# Patient Record
Sex: MALE | URBAN - METROPOLITAN AREA
[De-identification: names, ages, dates, MRNs, and addresses within clinical notes are randomized per-mention and may not be internally consistent; named-entity substitution may affect disease eponyms.]

---

## 2022-07-09 ENCOUNTER — TELEPHONE ENCOUNTER (OUTPATIENT)
Dept: URBAN - METROPOLITAN AREA CLINIC 121 | Facility: CLINIC | Age: 62
End: 2022-07-09

## 2022-07-10 ENCOUNTER — TELEPHONE ENCOUNTER (OUTPATIENT)
Dept: URBAN - METROPOLITAN AREA CLINIC 121 | Facility: CLINIC | Age: 62
End: 2022-07-10

## 2022-07-10 RX ORDER — EMTRICITABINE AND TENOFOVIR DISOPROXIL FUMARATE 200; 300 MG/1; MG/1
TABLET, FILM COATED ORAL
Refills: 0 | Status: ACTIVE | COMMUNITY
Start: 2012-05-03

## 2022-07-10 RX ORDER — DIAZEPAM 10 MG/1
TABLET ORAL
Refills: 0 | Status: ACTIVE | COMMUNITY
Start: 2012-05-03

## 2022-07-10 RX ORDER — LITHIUM CARBONATE 300 MG/1
CAPSULE, GELATIN COATED ORAL
Refills: 0 | Status: ACTIVE | COMMUNITY
Start: 2012-05-03

## 2022-07-30 ENCOUNTER — TELEPHONE ENCOUNTER (OUTPATIENT)
Age: 62
End: 2022-07-30

## 2022-07-30 RX ORDER — CIPROFLOXACIN HYDROCHLORIDE 500 MG/1
1 (ONE) TABLET TABLET, FILM COATED ORAL
Qty: 0 | Refills: 2 | OUTPATIENT
Start: 2015-06-19 | End: 2015-06-26

## 2022-07-30 RX ORDER — METRONIDAZOLE 250 MG/1
1 (ONE) TABLET ORAL
Qty: 0 | Refills: 2 | OUTPATIENT
Start: 2015-05-05 | End: 2015-05-12

## 2022-07-30 RX ORDER — CIPROFLOXACIN HCL 500 MG
1 (ONE) TABLET ORAL
Qty: 0 | Refills: 2 | OUTPATIENT
Start: 2015-05-05 | End: 2015-05-12

## 2022-07-30 RX ORDER — POLYETHYLENE GLYCOL 3350, SODIUM SULFATE ANHYDROUS, SODIUM BICARBONATE, SODIUM CHLORIDE, POTASSIUM CHLORIDE 236; 22.74; 6.74; 5.86; 2.97 G/4L; G/4L; G/4L; G/4L; G/4L
1 (ONE) POWDER, FOR SOLUTION ORAL
Qty: 0 | Refills: 2 | OUTPATIENT
Start: 2015-04-28 | End: 2015-04-29

## 2022-07-30 RX ORDER — LINACLOTIDE 290 UG/1
1 (ONE) CAPSULE, GELATIN COATED ORAL
Qty: 0 | Refills: 2 | OUTPATIENT
Start: 2015-04-28 | End: 2015-05-18

## 2022-07-30 RX ORDER — OXYCODONE HYDROCHLORIDE 5 MG/1
1 (ONE) CAPSULE ORAL
Qty: 0 | Refills: 2 | OUTPATIENT
Start: 2015-06-17 | End: 2015-07-01

## 2022-07-30 RX ORDER — METRONIDAZOLE 375 MG/1
1 (ONE) TABLET CAPSULE ORAL
Qty: 0 | Refills: 2 | OUTPATIENT
Start: 2015-06-19 | End: 2015-06-26

## 2022-07-30 RX ORDER — OMEPRAZOLE 40 MG/1
1 (ONE) CAPSULE, DELAYED RELEASE ORAL DAILY
Qty: 0 | Refills: 16 | OUTPATIENT
Start: 2012-05-29 | End: 2015-04-28

## 2022-07-31 ENCOUNTER — TELEPHONE ENCOUNTER (OUTPATIENT)
Age: 62
End: 2022-07-31

## 2022-07-31 RX ORDER — CIPROFLOXACIN HYDROCHLORIDE 500 MG/1
1 (ONE) TABLET TABLET, FILM COATED ORAL
Qty: 0 | Refills: 2 | Status: ACTIVE | COMMUNITY
Start: 2015-06-13

## 2022-07-31 RX ORDER — OMEPRAZOLE 40 MG/1
1 (ONE) CAPSULE, DELAYED RELEASE ORAL DAILY
Qty: 0 | Refills: 16 | Status: ACTIVE | COMMUNITY
Start: 2012-05-29

## 2022-07-31 RX ORDER — CIPROFLOXACIN HCL 500 MG
1 (ONE) TABLET ORAL
Qty: 0 | Refills: 2 | Status: ACTIVE | COMMUNITY
Start: 2015-05-05

## 2022-07-31 RX ORDER — METRONIDAZOLE 375 MG/1
1 (ONE) CAPSULE ORAL
Qty: 0 | Refills: 2 | Status: ACTIVE | COMMUNITY
Start: 2015-06-13

## 2022-07-31 RX ORDER — METRONIDAZOLE 250 MG/1
1 (ONE) TABLET ORAL
Qty: 0 | Refills: 2 | Status: ACTIVE | COMMUNITY
Start: 2015-04-28

## 2022-07-31 RX ORDER — OMEPRAZOLE 40 MG/1
1 (ONE) CAPSULE, DELAYED RELEASE ORAL DAILY
Qty: 0 | Refills: 16 | Status: ACTIVE | COMMUNITY
Start: 2012-05-03

## 2022-07-31 RX ORDER — METRONIDAZOLE 375 MG/1
1 (ONE) TABLET CAPSULE ORAL
Qty: 0 | Refills: 2 | Status: ACTIVE | COMMUNITY
Start: 2015-06-19

## 2022-07-31 RX ORDER — LINACLOTIDE 290 UG/1
1 (ONE) CAPSULE, GELATIN COATED ORAL
Qty: 0 | Refills: 2 | Status: ACTIVE | COMMUNITY
Start: 2015-04-28

## 2022-07-31 RX ORDER — POLYETHYLENE GLYCOL 3350, SODIUM SULFATE ANHYDROUS, SODIUM BICARBONATE, SODIUM CHLORIDE, POTASSIUM CHLORIDE 236; 22.74; 6.74; 5.86; 2.97 G/4L; G/4L; G/4L; G/4L; G/4L
1 (ONE) POWDER, FOR SOLUTION ORAL
Qty: 0 | Refills: 2 | Status: ACTIVE | COMMUNITY
Start: 2015-04-28

## 2022-07-31 RX ORDER — OXYCODONE HYDROCHLORIDE 5 MG/1
1 (ONE) CAPSULE ORAL
Qty: 0 | Refills: 2 | Status: ACTIVE | COMMUNITY
Start: 2015-06-17

## 2022-07-31 RX ORDER — METRONIDAZOLE 250 MG/1
1 (ONE) TABLET ORAL
Qty: 0 | Refills: 2 | Status: ACTIVE | COMMUNITY
Start: 2015-05-05

## 2022-07-31 RX ORDER — CIPROFLOXACIN HCL 500 MG
1 (ONE) TABLET ORAL
Qty: 0 | Refills: 2 | Status: ACTIVE | COMMUNITY
Start: 2015-04-28

## 2022-07-31 RX ORDER — CIPROFLOXACIN HYDROCHLORIDE 500 MG/1
1 (ONE) TABLET TABLET, FILM COATED ORAL
Qty: 0 | Refills: 2 | Status: ACTIVE | COMMUNITY
Start: 2015-06-19

## 2025-01-24 ENCOUNTER — TELEPHONE ENCOUNTER (OUTPATIENT)
Dept: URBAN - METROPOLITAN AREA CLINIC 7 | Facility: CLINIC | Age: 65
End: 2025-01-24

## 2025-02-06 ENCOUNTER — OFFICE VISIT (OUTPATIENT)
Dept: URBAN - METROPOLITAN AREA CLINIC 7 | Facility: CLINIC | Age: 65
End: 2025-02-06

## 2025-04-21 ENCOUNTER — OFFICE VISIT (OUTPATIENT)
Dept: URBAN - METROPOLITAN AREA CLINIC 7 | Facility: CLINIC | Age: 65
End: 2025-04-21

## 2025-04-30 ENCOUNTER — DASHBOARD ENCOUNTERS (OUTPATIENT)
Age: 65
End: 2025-04-30

## 2025-05-01 ENCOUNTER — OFFICE VISIT (OUTPATIENT)
Dept: URBAN - METROPOLITAN AREA CLINIC 7 | Facility: CLINIC | Age: 65
End: 2025-05-01
Payer: COMMERCIAL

## 2025-05-01 DIAGNOSIS — J34.89 NASAL CAVITY MASS: ICD-10-CM

## 2025-05-01 DIAGNOSIS — R13.19 ESOPHAGEAL DYSPHAGIA: ICD-10-CM

## 2025-05-01 DIAGNOSIS — Z12.11 COLON CANCER SCREENING: ICD-10-CM

## 2025-05-01 DIAGNOSIS — K22.2 SCHATZKI'S RING: ICD-10-CM

## 2025-05-01 PROBLEM — 235623002: Status: ACTIVE | Noted: 2025-05-01

## 2025-05-01 PROCEDURE — 99204 OFFICE O/P NEW MOD 45 MIN: CPT

## 2025-05-01 RX ORDER — OXYCODONE HYDROCHLORIDE 5 MG/1
1 (ONE) CAPSULE ORAL
Qty: 0 | Refills: 2 | Status: DISCONTINUED | COMMUNITY
Start: 2015-06-17

## 2025-05-01 RX ORDER — CIPROFLOXACIN HCL 500 MG
1 (ONE) TABLET ORAL
Qty: 0 | Refills: 2 | Status: DISCONTINUED | COMMUNITY
Start: 2015-05-05

## 2025-05-01 RX ORDER — ARIPIPRAZOLE 20 MG/1
1 TABLET TABLET ORAL ONCE A DAY
Status: ACTIVE | COMMUNITY

## 2025-05-01 RX ORDER — ONDANSETRON 4 MG/1
1 TABLET ON THE TONGUE AND ALLOW TO DISSOLVE TABLET, ORALLY DISINTEGRATING ORAL
Status: DISCONTINUED | COMMUNITY

## 2025-05-01 RX ORDER — CIPROFLOXACIN HYDROCHLORIDE 500 MG/1
1 (ONE) TABLET TABLET, FILM COATED ORAL
Qty: 0 | Refills: 2 | Status: DISCONTINUED | COMMUNITY
Start: 2015-06-19

## 2025-05-01 RX ORDER — LINACLOTIDE 290 UG/1
1 (ONE) CAPSULE, GELATIN COATED ORAL
Qty: 0 | Refills: 2 | Status: DISCONTINUED | COMMUNITY
Start: 2015-04-28

## 2025-05-01 RX ORDER — ESZOPICLONE 3 MG/1
1 TABLET IMMEDIATELY BEFORE BEDTIME TABLET, FILM COATED ORAL ONCE A DAY
Status: DISCONTINUED | COMMUNITY

## 2025-05-01 RX ORDER — METRONIDAZOLE 250 MG/1
1 (ONE) TABLET ORAL
Qty: 0 | Refills: 2 | Status: DISCONTINUED | COMMUNITY
Start: 2015-04-28

## 2025-05-01 RX ORDER — PANTOPRAZOLE SODIUM 40 MG/1
1 TABLET TABLET, DELAYED RELEASE ORAL ONCE A DAY
Status: ACTIVE | COMMUNITY

## 2025-05-01 RX ORDER — OMEPRAZOLE 40 MG/1
1 (ONE) CAPSULE, DELAYED RELEASE ORAL DAILY
Qty: 0 | Refills: 16 | Status: DISCONTINUED | COMMUNITY
Start: 2012-05-29

## 2025-05-01 RX ORDER — METRONIDAZOLE 375 MG/1
1 (ONE) TABLET CAPSULE ORAL
Qty: 0 | Refills: 2 | Status: DISCONTINUED | COMMUNITY
Start: 2015-06-19

## 2025-05-01 RX ORDER — EMTRICITABINE AND TENOFOVIR DISOPROXIL FUMARATE 200; 300 MG/1; MG/1
TABLET, FILM COATED ORAL
Refills: 0 | Status: DISCONTINUED | COMMUNITY
Start: 2012-05-03

## 2025-05-01 RX ORDER — POLYETHYLENE GLYCOL 3350, SODIUM SULFATE ANHYDROUS, SODIUM BICARBONATE, SODIUM CHLORIDE, POTASSIUM CHLORIDE 236; 22.74; 6.74; 5.86; 2.97 G/4L; G/4L; G/4L; G/4L; G/4L
1 (ONE) POWDER, FOR SOLUTION ORAL
Qty: 0 | Refills: 2 | Status: DISCONTINUED | COMMUNITY
Start: 2015-04-28

## 2025-05-01 RX ORDER — DIAZEPAM 10 MG/1
TABLET ORAL
Refills: 0 | Status: DISCONTINUED | COMMUNITY
Start: 2012-05-03

## 2025-05-01 RX ORDER — LITHIUM CARBONATE 300 MG/1
CAPSULE, GELATIN COATED ORAL
Refills: 0 | Status: DISCONTINUED | COMMUNITY
Start: 2012-05-03

## 2025-05-01 RX ORDER — LORAZEPAM 2 MG/1
1 TABLET ORAL EVERY 8 HOURS PRN
Status: ACTIVE | COMMUNITY

## 2025-05-01 NOTE — HPI-TODAY'S VISIT:
Patient is a 64-year-old male seen today following recent hospital admission. History of HIV, pancreatitis, bipolar disorder, as well as history of benign distal esophageal stricture/Schatzki's ring which was managed with stricturoplasty and esophageal dilation in 10/2023 in [facility] and has had serial dilations prior to that for several years.  He was admitted to [facility] 4/18/2025 for abdominal pain and hematemesis. Hemoglobin level remained stable with no further episodes of hematemesis in the hospital. Experiencing recurrence of dysphagia, mostly solid foods. GI was consulted and patient underwent EGD with dilation 4/21/2025. 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 Patient advised to follow-up with ENT outpatient. MRI abdomen 4/20/2025 bilateral Bosniak 1 and 2 cysts which do not typically require dedicated imaging. No suspicious renal mass. Normal liver, gallbladder, pancreas. Bowel is grossly unremarkable. Inpatient EGD performed by Dr. Buddy Schwartz -normal proximal esophagus, very tortuous distal esophagus. Benign-appearing stricture/Schatzki ring at GE junction at 40 cm with residual lumen of approximately 8 mm. This was dilated with post dilation bleeding. Otherwise mild nonerosive duodenitis. Mild nonerosive antral gastritis with mild streaky erythema otherwise normal stomach. 2 cm hiatal hernia. Stricture was dilated second time. Advised Protonix 40 mg twice a day and Carafate suspension 10 cc before every meal and at bedtime. Follow-up with gastroenterologist on an outpatient basis to arrange another EGD with large balloon dilation in 3 to 4 weeks. Hospital records were reviewed.  Recurrent intermittent dysphagia to solids. Symptoms are chronic but recently worse. Recent likely food bolus impaction that cleared spontaneously. Some difficulty with liquids. Some regurgitation of the swallowed food material.  He denies any further hematemesis post-hospital.  States weight has gone up since last couple EGDs. Pror to that was down about 20 lbs. He is drinking Boost nutritional beverages. Denies night sweats. Currently taking Protonix and carafate with benefit. Denies blood in stool.   Last colonoscopy 5/2015 with Dr. De Leon without polyps. Diverticulosis and internal hemorrhoids. Repeat in 5 to 10 years.

## 2025-05-02 ENCOUNTER — OFFICE VISIT (OUTPATIENT)
Dept: URBAN - METROPOLITAN AREA SURGERY CENTER 5 | Facility: SURGERY CENTER | Age: 65
End: 2025-05-02
Payer: COMMERCIAL

## 2025-05-02 ENCOUNTER — LAB OUTSIDE AN ENCOUNTER (OUTPATIENT)
Dept: URBAN - METROPOLITAN AREA SURGERY CENTER 5 | Facility: SURGERY CENTER | Age: 65
End: 2025-05-02

## 2025-05-02 DIAGNOSIS — K31.89 EROSIVE GASTROPATHY: ICD-10-CM

## 2025-05-02 DIAGNOSIS — K44.9 SLIDING HIATAL HERNIA: ICD-10-CM

## 2025-05-02 DIAGNOSIS — K44.9 DIAPHRAGMATIC HERNIA WITHOUT OBSTRUCTION OR GANGRENE: ICD-10-CM

## 2025-05-02 DIAGNOSIS — K22.2 ESOPHAGEAL STENOSIS: ICD-10-CM

## 2025-05-02 PROBLEM — 63305008: Status: ACTIVE | Noted: 2025-05-02

## 2025-05-02 PROCEDURE — 43249 ESOPH EGD DILATION <30 MM: CPT | Performed by: INTERNAL MEDICINE

## 2025-05-02 PROCEDURE — 00731 ANES UPR GI NDSC PX NOS: CPT | Performed by: NURSE ANESTHETIST, CERTIFIED REGISTERED

## 2025-05-02 RX ORDER — LORAZEPAM 2 MG/1
1 TABLET ORAL EVERY 8 HOURS PRN
Status: ACTIVE | COMMUNITY

## 2025-05-02 RX ORDER — ARIPIPRAZOLE 20 MG/1
1 TABLET TABLET ORAL ONCE A DAY
Status: ACTIVE | COMMUNITY

## 2025-05-02 RX ORDER — PANTOPRAZOLE SODIUM 40 MG/1
1 TABLET TABLET, DELAYED RELEASE ORAL ONCE A DAY
Status: ACTIVE | COMMUNITY

## 2025-05-03 ENCOUNTER — TELEPHONE ENCOUNTER (OUTPATIENT)
Dept: URBAN - METROPOLITAN AREA CLINIC 7 | Facility: CLINIC | Age: 65
End: 2025-05-03

## 2025-05-03 RX ORDER — SUCRALFATE 1 G/1
1 TABLET ON AN EMPTY STOMACH TABLET ORAL TWICE A DAY
Qty: 180 TABLET | Refills: 3 | OUTPATIENT
Start: 2025-05-03

## 2025-05-03 RX ORDER — PANTOPRAZOLE SODIUM 40 MG/1
1 TABLET TABLET, DELAYED RELEASE ORAL TWICE A DAY
Qty: 180 TABLET | Refills: 3 | OUTPATIENT
Start: 2025-05-03

## 2025-05-03 RX ORDER — FAMOTIDINE 40 MG/1
1 TABLET TABLET, FILM COATED ORAL TWICE A DAY
Qty: 180 TABLET | Refills: 3 | OUTPATIENT
Start: 2025-05-03

## 2025-05-13 ENCOUNTER — CLAIMS CREATED FROM THE CLAIM WINDOW (OUTPATIENT)
Dept: URBAN - METROPOLITAN AREA CLINIC 4 | Facility: CLINIC | Age: 65
End: 2025-05-13
Payer: COMMERCIAL

## 2025-05-13 ENCOUNTER — CLAIMS CREATED FROM THE CLAIM WINDOW (OUTPATIENT)
Dept: URBAN - METROPOLITAN AREA SURGERY CENTER 5 | Facility: SURGERY CENTER | Age: 65
End: 2025-05-13
Payer: COMMERCIAL

## 2025-05-13 ENCOUNTER — TELEPHONE ENCOUNTER (OUTPATIENT)
Dept: URBAN - METROPOLITAN AREA CLINIC 7 | Facility: CLINIC | Age: 65
End: 2025-05-13

## 2025-05-13 DIAGNOSIS — K44.9 DIAPHRAGMATIC HERNIA WITHOUT OBSTRUCTION OR GANGRENE: ICD-10-CM

## 2025-05-13 DIAGNOSIS — K22.2 ESOPHAGEAL STENOSIS: ICD-10-CM

## 2025-05-13 DIAGNOSIS — K21.9 GASTRO-ESOPHAGEAL REFLUX DISEASE WITHOUT ESOPHAGITIS: ICD-10-CM

## 2025-05-13 DIAGNOSIS — K31.89 OTHER DISEASES OF STOMACH AND DUODENUM: ICD-10-CM

## 2025-05-13 DIAGNOSIS — K26.9 DUODENAL ULCER, UNSPECIFIED AS ACUTE OR CHRONIC, WITHOUT HEMORRHAGE OR PERFORATION: ICD-10-CM

## 2025-05-13 DIAGNOSIS — K26.9 DUODENAL EROSION: ICD-10-CM

## 2025-05-13 DIAGNOSIS — F41.9 ANXIETY: ICD-10-CM

## 2025-05-13 DIAGNOSIS — K31.89 EROSIVE GASTROPATHY: ICD-10-CM

## 2025-05-13 DIAGNOSIS — K44.9 HIATAL HERNIA: ICD-10-CM

## 2025-05-13 PROCEDURE — 88305 TISSUE EXAM BY PATHOLOGIST: CPT | Performed by: PATHOLOGY

## 2025-05-13 PROCEDURE — 43249 ESOPH EGD DILATION <30 MM: CPT | Performed by: INTERNAL MEDICINE

## 2025-05-13 PROCEDURE — 00731 ANES UPR GI NDSC PX NOS: CPT | Performed by: NURSE ANESTHETIST, CERTIFIED REGISTERED

## 2025-05-13 RX ORDER — FAMOTIDINE 40 MG/1
1 TABLET TABLET, FILM COATED ORAL TWICE A DAY
Qty: 180 TABLET | Refills: 3
Start: 2025-05-03

## 2025-05-13 RX ORDER — PANTOPRAZOLE SODIUM 40 MG/1
1 TABLET TABLET, DELAYED RELEASE ORAL TWICE A DAY
Qty: 180 TABLET | Refills: 3 | Status: ACTIVE | COMMUNITY
Start: 2025-05-03

## 2025-05-13 RX ORDER — ARIPIPRAZOLE 20 MG/1
1 TABLET TABLET ORAL ONCE A DAY
Status: ACTIVE | COMMUNITY

## 2025-05-13 RX ORDER — PANTOPRAZOLE SODIUM 40 MG/1
1 TABLET TABLET, DELAYED RELEASE ORAL TWICE A DAY
Qty: 180 TABLET | Refills: 3
Start: 2025-05-03

## 2025-05-13 RX ORDER — SUCRALFATE 1 G/1
1 TABLET ON AN EMPTY STOMACH TABLET ORAL TWICE A DAY
Qty: 180 TABLET | Refills: 3 | Status: ACTIVE | COMMUNITY
Start: 2025-05-03

## 2025-05-13 RX ORDER — LORAZEPAM 2 MG/1
1 TABLET ORAL EVERY 8 HOURS PRN
Status: ACTIVE | COMMUNITY

## 2025-05-13 RX ORDER — FAMOTIDINE 40 MG/1
1 TABLET TABLET, FILM COATED ORAL TWICE A DAY
Qty: 180 TABLET | Refills: 3 | Status: ACTIVE | COMMUNITY
Start: 2025-05-03

## 2025-05-13 RX ORDER — PANTOPRAZOLE SODIUM 40 MG/1
1 TABLET TABLET, DELAYED RELEASE ORAL ONCE A DAY
Status: ACTIVE | COMMUNITY

## 2025-05-13 RX ORDER — SUCRALFATE 1 G/1
1 TABLET ON AN EMPTY STOMACH TABLET ORAL TWICE A DAY
Qty: 180 TABLET | Refills: 3
Start: 2025-05-03